# Patient Record
Sex: MALE | Race: ASIAN | NOT HISPANIC OR LATINO
[De-identification: names, ages, dates, MRNs, and addresses within clinical notes are randomized per-mention and may not be internally consistent; named-entity substitution may affect disease eponyms.]

---

## 2024-07-31 PROBLEM — Z00.00 ENCOUNTER FOR PREVENTIVE HEALTH EXAMINATION: Status: ACTIVE | Noted: 2024-07-31

## 2024-09-10 ENCOUNTER — APPOINTMENT (OUTPATIENT)
Age: 49
End: 2024-09-10

## 2025-03-17 ENCOUNTER — INPATIENT (INPATIENT)
Facility: HOSPITAL | Age: 50
LOS: 3 days | Discharge: HOME | End: 2025-03-21
Attending: INTERNAL MEDICINE | Admitting: INTERNAL MEDICINE
Payer: COMMERCIAL

## 2025-03-17 VITALS
OXYGEN SATURATION: 96 % | TEMPERATURE: 98 F | SYSTOLIC BLOOD PRESSURE: 150 MMHG | WEIGHT: 160.06 LBS | HEART RATE: 79 BPM | DIASTOLIC BLOOD PRESSURE: 81 MMHG | RESPIRATION RATE: 19 BRPM | HEIGHT: 61 IN

## 2025-03-17 DIAGNOSIS — R79.89 OTHER SPECIFIED ABNORMAL FINDINGS OF BLOOD CHEMISTRY: ICD-10-CM

## 2025-03-17 DIAGNOSIS — E78.5 HYPERLIPIDEMIA, UNSPECIFIED: ICD-10-CM

## 2025-03-17 LAB
ALBUMIN SERPL ELPH-MCNC: 3.6 G/DL — SIGNIFICANT CHANGE UP (ref 3.3–5)
ALP SERPL-CCNC: 98 U/L — SIGNIFICANT CHANGE UP (ref 40–120)
ALT FLD-CCNC: 264 U/L — HIGH (ref 12–78)
ANION GAP SERPL CALC-SCNC: 3 MMOL/L — LOW (ref 5–17)
APPEARANCE UR: CLEAR — SIGNIFICANT CHANGE UP
AST SERPL-CCNC: 999 U/L — HIGH (ref 15–37)
BACTERIA # UR AUTO: NEGATIVE /HPF — SIGNIFICANT CHANGE UP
BASOPHILS # BLD AUTO: 0.02 K/UL — SIGNIFICANT CHANGE UP (ref 0–0.2)
BASOPHILS NFR BLD AUTO: 0.2 % — SIGNIFICANT CHANGE UP (ref 0–2)
BILIRUB SERPL-MCNC: 1.1 MG/DL — SIGNIFICANT CHANGE UP (ref 0.2–1.2)
BILIRUB UR-MCNC: NEGATIVE — SIGNIFICANT CHANGE UP
BUN SERPL-MCNC: 15 MG/DL — SIGNIFICANT CHANGE UP (ref 7–23)
CALCIUM SERPL-MCNC: 9 MG/DL — SIGNIFICANT CHANGE UP (ref 8.5–10.1)
CK SERPL-CCNC: SIGNIFICANT CHANGE UP U/L (ref 26–308)
CO2 SERPL-SCNC: 29 MMOL/L — SIGNIFICANT CHANGE UP (ref 22–31)
COLOR SPEC: YELLOW — SIGNIFICANT CHANGE UP
CREAT SERPL-MCNC: 0.95 MG/DL — SIGNIFICANT CHANGE UP (ref 0.5–1.3)
DIFF PNL FLD: ABNORMAL
EGFR: 97 ML/MIN/1.73M2 — SIGNIFICANT CHANGE UP
EGFR: 97 ML/MIN/1.73M2 — SIGNIFICANT CHANGE UP
EOSINOPHIL # BLD AUTO: 0.04 K/UL — SIGNIFICANT CHANGE UP (ref 0–0.5)
EOSINOPHIL NFR BLD AUTO: 0.5 % — SIGNIFICANT CHANGE UP (ref 0–6)
EPI CELLS # UR: PRESENT
FLUAV AG NPH QL: SIGNIFICANT CHANGE UP
FLUBV AG NPH QL: SIGNIFICANT CHANGE UP
GLUCOSE SERPL-MCNC: 114 MG/DL — HIGH (ref 70–99)
GLUCOSE UR QL: NEGATIVE MG/DL — SIGNIFICANT CHANGE UP
HCT VFR BLD CALC: 39.7 % — SIGNIFICANT CHANGE UP (ref 39–50)
HGB BLD-MCNC: 13.3 G/DL — SIGNIFICANT CHANGE UP (ref 13–17)
IMM GRANULOCYTES NFR BLD AUTO: 0.2 % — SIGNIFICANT CHANGE UP (ref 0–0.9)
KETONES UR-MCNC: NEGATIVE MG/DL — SIGNIFICANT CHANGE UP
LACTATE SERPL-SCNC: 1.3 MMOL/L — SIGNIFICANT CHANGE UP (ref 0.7–2)
LEUKOCYTE ESTERASE UR-ACNC: NEGATIVE — SIGNIFICANT CHANGE UP
LYMPHOCYTES # BLD AUTO: 3.63 K/UL — HIGH (ref 1–3.3)
LYMPHOCYTES # BLD AUTO: 43.8 % — SIGNIFICANT CHANGE UP (ref 13–44)
MCHC RBC-ENTMCNC: 29.3 PG — SIGNIFICANT CHANGE UP (ref 27–34)
MCHC RBC-ENTMCNC: 33.5 G/DL — SIGNIFICANT CHANGE UP (ref 32–36)
MCV RBC AUTO: 87.4 FL — SIGNIFICANT CHANGE UP (ref 80–100)
MONOCYTES # BLD AUTO: 0.5 K/UL — SIGNIFICANT CHANGE UP (ref 0–0.9)
NEUTROPHILS # BLD AUTO: 4.08 K/UL — SIGNIFICANT CHANGE UP (ref 1.8–7.4)
NEUTROPHILS NFR BLD AUTO: 49.3 % — SIGNIFICANT CHANGE UP (ref 43–77)
NITRITE UR-MCNC: NEGATIVE — SIGNIFICANT CHANGE UP
NRBC BLD AUTO-RTO: 0 /100 WBCS — SIGNIFICANT CHANGE UP (ref 0–0)
PH UR: 6 — SIGNIFICANT CHANGE UP (ref 5–8)
PLATELET # BLD AUTO: 264 K/UL — SIGNIFICANT CHANGE UP (ref 150–400)
POTASSIUM SERPL-MCNC: 4.7 MMOL/L — SIGNIFICANT CHANGE UP (ref 3.5–5.3)
POTASSIUM SERPL-SCNC: 4.7 MMOL/L — SIGNIFICANT CHANGE UP (ref 3.5–5.3)
PROT SERPL-MCNC: 7.5 GM/DL — SIGNIFICANT CHANGE UP (ref 6–8.3)
PROT UR-MCNC: 30 MG/DL
RBC # BLD: 4.54 M/UL — SIGNIFICANT CHANGE UP (ref 4.2–5.8)
RBC # FLD: 13.5 % — SIGNIFICANT CHANGE UP (ref 10.3–14.5)
RBC CASTS # UR COMP ASSIST: SIGNIFICANT CHANGE UP /HPF (ref 0–4)
RSV RNA NPH QL NAA+NON-PROBE: SIGNIFICANT CHANGE UP
SARS-COV-2 RNA SPEC QL NAA+PROBE: SIGNIFICANT CHANGE UP
SODIUM SERPL-SCNC: 138 MMOL/L — SIGNIFICANT CHANGE UP (ref 135–145)
UROBILINOGEN FLD QL: 0.2 MG/DL — SIGNIFICANT CHANGE UP (ref 0.2–1)
WBC # BLD: 8.29 K/UL — SIGNIFICANT CHANGE UP (ref 3.8–10.5)
WBC UR QL: SIGNIFICANT CHANGE UP /HPF (ref 0–5)

## 2025-03-17 PROCEDURE — 99222 1ST HOSP IP/OBS MODERATE 55: CPT

## 2025-03-17 PROCEDURE — 76705 ECHO EXAM OF ABDOMEN: CPT | Mod: 26

## 2025-03-17 PROCEDURE — 99285 EMERGENCY DEPT VISIT HI MDM: CPT

## 2025-03-17 PROCEDURE — 74177 CT ABD & PELVIS W/CONTRAST: CPT | Mod: 26

## 2025-03-17 RX ORDER — ACETAMINOPHEN 500 MG/5ML
975 LIQUID (ML) ORAL ONCE
Refills: 0 | Status: COMPLETED | OUTPATIENT
Start: 2025-03-17 | End: 2025-03-17

## 2025-03-17 RX ORDER — SODIUM CHLORIDE 9 G/1000ML
1000 INJECTION, SOLUTION INTRAVENOUS
Refills: 0 | Status: DISCONTINUED | OUTPATIENT
Start: 2025-03-17 | End: 2025-03-19

## 2025-03-17 RX ORDER — LIDOCAINE HYDROCHLORIDE 20 MG/ML
1 JELLY TOPICAL ONCE
Refills: 0 | Status: COMPLETED | OUTPATIENT
Start: 2025-03-17 | End: 2025-03-17

## 2025-03-17 RX ORDER — IBUPROFEN 200 MG
600 TABLET ORAL ONCE
Refills: 0 | Status: COMPLETED | OUTPATIENT
Start: 2025-03-17 | End: 2025-03-17

## 2025-03-17 RX ORDER — MELATONIN 5 MG
3 TABLET ORAL AT BEDTIME
Refills: 0 | Status: DISCONTINUED | OUTPATIENT
Start: 2025-03-17 | End: 2025-03-21

## 2025-03-17 RX ORDER — INFLUENZA A VIRUS A/IDAHO/07/2018 (H1N1) ANTIGEN (MDCK CELL DERIVED, PROPIOLACTONE INACTIVATED, INFLUENZA A VIRUS A/INDIANA/08/2018 (H3N2) ANTIGEN (MDCK CELL DERIVED, PROPIOLACTONE INACTIVATED), INFLUENZA B VIRUS B/SINGAPORE/INFTT-16-0610/2016 ANTIGEN (MDCK CELL DERIVED, PROPIOLACTONE INACTIVATED), INFLUENZA B VIRUS B/IOWA/06/2017 ANTIGEN (MDCK CELL DERIVED, PROPIOLACTONE INACTIVATED) 15; 15; 15; 15 UG/.5ML; UG/.5ML; UG/.5ML; UG/.5ML
0.5 INJECTION, SUSPENSION INTRAMUSCULAR ONCE
Refills: 0 | Status: DISCONTINUED | OUTPATIENT
Start: 2025-03-17 | End: 2025-03-21

## 2025-03-17 RX ADMIN — LIDOCAINE HYDROCHLORIDE 1 PATCH: 20 JELLY TOPICAL at 10:34

## 2025-03-17 RX ADMIN — Medication 600 MILLIGRAM(S): at 21:37

## 2025-03-17 RX ADMIN — LIDOCAINE HYDROCHLORIDE 1 PATCH: 20 JELLY TOPICAL at 23:37

## 2025-03-17 RX ADMIN — LIDOCAINE HYDROCHLORIDE 1 PATCH: 20 JELLY TOPICAL at 19:36

## 2025-03-17 RX ADMIN — Medication 1000 MILLILITER(S): at 13:53

## 2025-03-17 RX ADMIN — Medication 975 MILLIGRAM(S): at 10:33

## 2025-03-17 RX ADMIN — Medication 1000 MILLILITER(S): at 12:42

## 2025-03-17 RX ADMIN — Medication 2000 MILLILITER(S): at 10:37

## 2025-03-17 RX ADMIN — Medication 2000 MILLILITER(S): at 13:31

## 2025-03-17 RX ADMIN — Medication 975 MILLIGRAM(S): at 13:30

## 2025-03-17 RX ADMIN — Medication 600 MILLIGRAM(S): at 22:39

## 2025-03-17 RX ADMIN — SODIUM CHLORIDE 150 MILLILITER(S): 9 INJECTION, SOLUTION INTRAVENOUS at 16:58

## 2025-03-17 NOTE — H&P ADULT - ASSESSMENT
49 years old male with h/o HLD present to ED with complain of low back pain and tea colored urine for 1 day. No recently viral symptoms. Patient was off work for 7 and 1/2 month. He just returned to work 3 days ago. He works as  and his work involved a lot of strenuous physical activity.   Hemodynamically stable, afebrile, sat well at RA. No leukocytosis, plt 264, Cr 0.95, AST/999/264, [place] [address]. CT abd/pelvis with no acute pathology

## 2025-03-17 NOTE — ED ADULT NURSE NOTE - OBJECTIVE STATEMENT
Patient alert and verbally responsive, came in due to  left flank pain and swelling start yesterday morning, Went to urgent care and had blood test done and was told  CK, AST and ALT were high, advised to go to ED. No PMH.

## 2025-03-17 NOTE — ED ADULT NURSE NOTE - NSFALLUNIVINTERV_ED_ALL_ED
Bed/Stretcher in lowest position, wheels locked, appropriate side rails in place/Call bell, personal items and telephone in reach/Instruct patient to call for assistance before getting out of bed/chair/stretcher/Non-slip footwear applied when patient is off stretcher/Amorita to call system/Physically safe environment - no spills, clutter or unnecessary equipment/Purposeful proactive rounding/Room/bathroom lighting operational, light cord in reach

## 2025-03-17 NOTE — H&P ADULT - PROBLEM SELECTOR PLAN 1
lower back pain  CT abd/pelvis  ( I personally review) with no acute pathology  [place] [address]  IV fluid LR 150ml/hr   Trend CK  closely monitor renal function  hold statin  Check RVP

## 2025-03-17 NOTE — H&P ADULT - HISTORY OF PRESENT ILLNESS
49 years old male with h/o HLD present to ED with complain of low back pain and tea colored urine for 1 day. No recently viral symptoms. Patient was off work for 7 and 1/2 month. He just returned to work 3 days ago. He works as  and his work involved a lot of strenuous physical activity.   Hemodynamically stable, afebrile, sat well at RA. No leukocytosis, plt 264, Cr 0.95, AST/999/264, [place] [address]. CT abd/pelvis with no acute pathology

## 2025-03-17 NOTE — ED PROVIDER NOTE - ATTENDING APP SHARED VISIT CONTRIBUTION OF CARE
I independently evaluated the patient and I concur with MADYSON Cavanaugh's H&P as above. Elevated LFTs likely 2/2 rhabdomyolysis which is likely due to increased exertion as pt states hes trying to lose weight and has to go to PT due to recent left shoulder injury vs statin usage. Given severity of CK elevation, admission indicated.

## 2025-03-17 NOTE — H&P ADULT - NSHPPHYSICALEXAM_GEN_ALL_CORE
CONSTITUTIONAL: alert and cooperative, no acute distress.  EYES: PERRL,  no scleral icterus  ENT: Mucosa moist, tongue normal  NECK: Neck supple, trachea midline, non-tender  CARDIAC: Normal S1 and S2. Regular rate and rhythms. No Pedal edema  LUNGS: Equal air entry both lungs. No rales, rhonchi, wheezing. Normal respiratory effort.   ABDOMEN: Soft, nondistended, nontender. No guarding or rebound tenderness. No hepatomegaly or splenomegaly. Bowel sound normal.   MUSCULOSKELETAL: Normocephalic, atraumatic. Slight lower back muscle tenderness+.  NEUROLOGICAL: No gross motor or sensory deficits  SKIN: no lesions or eruptions. Normal turgor  PSYCHIATRIC: A&O x 3, appropriate mood and affect

## 2025-03-17 NOTE — ED PROVIDER NOTE - PHYSICAL EXAMINATION
PHYSICAL EXAM:    GENERAL: Alert, appears stated age, well appearing, non-toxic  SKIN: Warm, and dry.   HEAD: NC, AT  EYE: Normal lids/conjunctiva, PERRL, EOMI  ENT: Normal hearing, patent oropharynx   NECK: +supple. No meningismus, or JVD  Pulm: Bilateral BS, normal resp effort, no wheezes, stridor, or retractions  CV: RRR, no M/R/G, 2+and = radial pulses  Abd: soft, non-tender, non-distended, no rebound/guarding. no CVA tenderness.   Mskel: no erythema, cyanosis, edema. no calf tenderness. +left paralumbar ttp. no spinal ttp.   Neuro: AAOx3, moving extremities

## 2025-03-17 NOTE — PHARMACOTHERAPY INTERVENTION NOTE - COMMENTS
Recommended to discontinue sodium chloride 0.9% maintenance fluids as patient also has lactated ringer's ordered.

## 2025-03-17 NOTE — ED ADULT TRIAGE NOTE - CHIEF COMPLAINT QUOTE
Came in for left flank pain and swelling start yesterday morning, Went to urgent care and had blood test done. Was called last night by urgent care that CK, AST and ALT were high, advised to go to ED. No PMH.

## 2025-03-17 NOTE — ED PROVIDER NOTE - CLINICAL SUMMARY MEDICAL DECISION MAKING FREE TEXT BOX
MADYSON Cavanaugh:   49-year-old male with PMH HLD on atorvastatin presents with L flank pain ?swelling, hematuria x yesterday-- significantly improved, had labs done @ [facility] yesterday and today sent in by urgent care for elevated CK level 35,000, elevated AST/ALT 500s.  no cp, sob, abd pain, fever, sweats, chills, n/v/d, dysuria, trauma.  exam as above  ddx includes rhabdomyolysis, kidney stone, uti, pyelo, gallbladder pathology  Ct neg for acute path  labs remarkable for ck 44K, ast 999/264-- tbili 1.1  pending RUQ sono.  discussed with Dr. Alcocer, accepts admission to his service

## 2025-03-18 LAB
ALBUMIN SERPL ELPH-MCNC: 3.2 G/DL — LOW (ref 3.3–5)
ALP SERPL-CCNC: 80 U/L — SIGNIFICANT CHANGE UP (ref 40–120)
ALT FLD-CCNC: 228 U/L — HIGH (ref 12–78)
ANION GAP SERPL CALC-SCNC: 3 MMOL/L — LOW (ref 5–17)
AST SERPL-CCNC: 646 U/L — HIGH (ref 15–37)
BILIRUB SERPL-MCNC: 0.7 MG/DL — SIGNIFICANT CHANGE UP (ref 0.2–1.2)
BUN SERPL-MCNC: 12 MG/DL — SIGNIFICANT CHANGE UP (ref 7–23)
CALCIUM SERPL-MCNC: 8.7 MG/DL — SIGNIFICANT CHANGE UP (ref 8.5–10.1)
CHLORIDE SERPL-SCNC: 107 MMOL/L — SIGNIFICANT CHANGE UP (ref 96–108)
CK SERPL-CCNC: SIGNIFICANT CHANGE UP U/L (ref 26–308)
CO2 SERPL-SCNC: 29 MMOL/L — SIGNIFICANT CHANGE UP (ref 22–31)
CREAT SERPL-MCNC: 0.85 MG/DL — SIGNIFICANT CHANGE UP (ref 0.5–1.3)
EGFR: 105 ML/MIN/1.73M2 — SIGNIFICANT CHANGE UP
EGFR: 105 ML/MIN/1.73M2 — SIGNIFICANT CHANGE UP
GLUCOSE SERPL-MCNC: 115 MG/DL — HIGH (ref 70–99)
HCT VFR BLD CALC: 36.5 % — LOW (ref 39–50)
HGB BLD-MCNC: 11.8 G/DL — LOW (ref 13–17)
MAGNESIUM SERPL-MCNC: 2.1 MG/DL — SIGNIFICANT CHANGE UP (ref 1.6–2.6)
MCHC RBC-ENTMCNC: 28.5 PG — SIGNIFICANT CHANGE UP (ref 27–34)
MCV RBC AUTO: 88.2 FL — SIGNIFICANT CHANGE UP (ref 80–100)
NRBC BLD AUTO-RTO: 0 /100 WBCS — SIGNIFICANT CHANGE UP (ref 0–0)
PHOSPHATE SERPL-MCNC: 3.3 MG/DL — SIGNIFICANT CHANGE UP (ref 2.5–4.5)
PLATELET # BLD AUTO: 237 K/UL — SIGNIFICANT CHANGE UP (ref 150–400)
POTASSIUM SERPL-MCNC: 4.3 MMOL/L — SIGNIFICANT CHANGE UP (ref 3.5–5.3)
POTASSIUM SERPL-SCNC: 4.3 MMOL/L — SIGNIFICANT CHANGE UP (ref 3.5–5.3)
RBC # BLD: 4.14 M/UL — LOW (ref 4.2–5.8)
RBC # FLD: 13.4 % — SIGNIFICANT CHANGE UP (ref 10.3–14.5)
SODIUM SERPL-SCNC: 139 MMOL/L — SIGNIFICANT CHANGE UP (ref 135–145)
WBC # BLD: 7.72 K/UL — SIGNIFICANT CHANGE UP (ref 3.8–10.5)
WBC # FLD AUTO: 7.72 K/UL — SIGNIFICANT CHANGE UP (ref 3.8–10.5)

## 2025-03-18 PROCEDURE — 99232 SBSQ HOSP IP/OBS MODERATE 35: CPT

## 2025-03-18 RX ORDER — OXYCODONE HYDROCHLORIDE 30 MG/1
5 TABLET ORAL ONCE
Refills: 0 | Status: DISCONTINUED | OUTPATIENT
Start: 2025-03-18 | End: 2025-03-18

## 2025-03-18 RX ORDER — LIDOCAINE HYDROCHLORIDE 20 MG/ML
1 JELLY TOPICAL EVERY 24 HOURS
Refills: 0 | Status: DISCONTINUED | OUTPATIENT
Start: 2025-03-18 | End: 2025-03-21

## 2025-03-18 RX ORDER — KETOROLAC TROMETHAMINE 30 MG/ML
15 INJECTION, SOLUTION INTRAMUSCULAR; INTRAVENOUS ONCE
Refills: 0 | Status: DISCONTINUED | OUTPATIENT
Start: 2025-03-18 | End: 2025-03-18

## 2025-03-18 RX ADMIN — KETOROLAC TROMETHAMINE 15 MILLIGRAM(S): 30 INJECTION, SOLUTION INTRAMUSCULAR; INTRAVENOUS at 02:04

## 2025-03-18 RX ADMIN — KETOROLAC TROMETHAMINE 15 MILLIGRAM(S): 30 INJECTION, SOLUTION INTRAMUSCULAR; INTRAVENOUS at 03:45

## 2025-03-18 RX ADMIN — OXYCODONE HYDROCHLORIDE 5 MILLIGRAM(S): 30 TABLET ORAL at 23:58

## 2025-03-18 RX ADMIN — LIDOCAINE HYDROCHLORIDE 1 PATCH: 20 JELLY TOPICAL at 23:58

## 2025-03-18 RX ADMIN — SODIUM CHLORIDE 150 MILLILITER(S): 9 INJECTION, SOLUTION INTRAVENOUS at 13:58

## 2025-03-18 RX ADMIN — SODIUM CHLORIDE 150 MILLILITER(S): 9 INJECTION, SOLUTION INTRAVENOUS at 06:20

## 2025-03-19 LAB
ALBUMIN SERPL ELPH-MCNC: 3.5 G/DL — SIGNIFICANT CHANGE UP (ref 3.3–5)
ALP SERPL-CCNC: 83 U/L — SIGNIFICANT CHANGE UP (ref 40–120)
ALT FLD-CCNC: 235 U/L — HIGH (ref 12–78)
AST SERPL-CCNC: 482 U/L — HIGH (ref 15–37)
BILIRUB SERPL-MCNC: 0.7 MG/DL — SIGNIFICANT CHANGE UP (ref 0.2–1.2)
BUN SERPL-MCNC: 13 MG/DL — SIGNIFICANT CHANGE UP (ref 7–23)
CALCIUM SERPL-MCNC: 9.4 MG/DL — SIGNIFICANT CHANGE UP (ref 8.5–10.1)
CHLORIDE SERPL-SCNC: 103 MMOL/L — SIGNIFICANT CHANGE UP (ref 96–108)
CK SERPL-CCNC: SIGNIFICANT CHANGE UP U/L (ref 26–308)
CO2 SERPL-SCNC: 30 MMOL/L — SIGNIFICANT CHANGE UP (ref 22–31)
CREAT SERPL-MCNC: 0.92 MG/DL — SIGNIFICANT CHANGE UP (ref 0.5–1.3)
EGFR: 101 ML/MIN/1.73M2 — SIGNIFICANT CHANGE UP
EGFR: 101 ML/MIN/1.73M2 — SIGNIFICANT CHANGE UP
GLUCOSE SERPL-MCNC: 115 MG/DL — HIGH (ref 70–99)
HCT VFR BLD CALC: 39.3 % — SIGNIFICANT CHANGE UP (ref 39–50)
HGB BLD-MCNC: 12.8 G/DL — LOW (ref 13–17)
MCHC RBC-ENTMCNC: 28.6 PG — SIGNIFICANT CHANGE UP (ref 27–34)
MCHC RBC-ENTMCNC: 32.6 G/DL — SIGNIFICANT CHANGE UP (ref 32–36)
MCV RBC AUTO: 87.9 FL — SIGNIFICANT CHANGE UP (ref 80–100)
NRBC BLD AUTO-RTO: 0 /100 WBCS — SIGNIFICANT CHANGE UP (ref 0–0)
PLATELET # BLD AUTO: 276 K/UL — SIGNIFICANT CHANGE UP (ref 150–400)
POTASSIUM SERPL-MCNC: 3.9 MMOL/L — SIGNIFICANT CHANGE UP (ref 3.5–5.3)
POTASSIUM SERPL-SCNC: 3.9 MMOL/L — SIGNIFICANT CHANGE UP (ref 3.5–5.3)
PROT SERPL-MCNC: 7.3 GM/DL — SIGNIFICANT CHANGE UP (ref 6–8.3)
RBC # BLD: 4.47 M/UL — SIGNIFICANT CHANGE UP (ref 4.2–5.8)
RBC # FLD: 13.3 % — SIGNIFICANT CHANGE UP (ref 10.3–14.5)
WBC # BLD: 9.56 K/UL — SIGNIFICANT CHANGE UP (ref 3.8–10.5)
WBC # FLD AUTO: 9.56 K/UL — SIGNIFICANT CHANGE UP (ref 3.8–10.5)

## 2025-03-19 PROCEDURE — 99232 SBSQ HOSP IP/OBS MODERATE 35: CPT

## 2025-03-19 RX ORDER — OXYCODONE HYDROCHLORIDE 30 MG/1
5 TABLET ORAL ONCE
Refills: 0 | Status: DISCONTINUED | OUTPATIENT
Start: 2025-03-19 | End: 2025-03-19

## 2025-03-19 RX ORDER — SODIUM CHLORIDE 9 G/1000ML
1000 INJECTION, SOLUTION INTRAVENOUS
Refills: 0 | Status: DISCONTINUED | OUTPATIENT
Start: 2025-03-19 | End: 2025-03-21

## 2025-03-19 RX ADMIN — OXYCODONE HYDROCHLORIDE 5 MILLIGRAM(S): 30 TABLET ORAL at 00:28

## 2025-03-19 RX ADMIN — SODIUM CHLORIDE 150 MILLILITER(S): 9 INJECTION, SOLUTION INTRAVENOUS at 10:29

## 2025-03-19 RX ADMIN — Medication 20 MILLIGRAM(S): at 21:45

## 2025-03-19 RX ADMIN — SODIUM CHLORIDE 175 MILLILITER(S): 9 INJECTION, SOLUTION INTRAVENOUS at 17:57

## 2025-03-19 RX ADMIN — Medication 3 MILLIGRAM(S): at 02:05

## 2025-03-19 RX ADMIN — LIDOCAINE HYDROCHLORIDE 1 PATCH: 20 JELLY TOPICAL at 11:11

## 2025-03-19 RX ADMIN — OXYCODONE HYDROCHLORIDE 5 MILLIGRAM(S): 30 TABLET ORAL at 06:57

## 2025-03-19 RX ADMIN — LIDOCAINE HYDROCHLORIDE 1 PATCH: 20 JELLY TOPICAL at 07:11

## 2025-03-19 RX ADMIN — OXYCODONE HYDROCHLORIDE 5 MILLIGRAM(S): 30 TABLET ORAL at 07:27

## 2025-03-20 LAB
ALBUMIN SERPL ELPH-MCNC: 3.4 G/DL — SIGNIFICANT CHANGE UP (ref 3.3–5)
ALP SERPL-CCNC: 82 U/L — SIGNIFICANT CHANGE UP (ref 40–120)
ANION GAP SERPL CALC-SCNC: 5 MMOL/L — SIGNIFICANT CHANGE UP (ref 5–17)
BILIRUB SERPL-MCNC: 0.7 MG/DL — SIGNIFICANT CHANGE UP (ref 0.2–1.2)
BUN SERPL-MCNC: 12 MG/DL — SIGNIFICANT CHANGE UP (ref 7–23)
CALCIUM SERPL-MCNC: 9.2 MG/DL — SIGNIFICANT CHANGE UP (ref 8.5–10.1)
CHLORIDE SERPL-SCNC: 104 MMOL/L — SIGNIFICANT CHANGE UP (ref 96–108)
CK SERPL-CCNC: 6222 U/L — HIGH (ref 26–308)
CO2 SERPL-SCNC: 29 MMOL/L — SIGNIFICANT CHANGE UP (ref 22–31)
CREAT SERPL-MCNC: 0.89 MG/DL — SIGNIFICANT CHANGE UP (ref 0.5–1.3)
EGFR: 104 ML/MIN/1.73M2 — SIGNIFICANT CHANGE UP
EGFR: 104 ML/MIN/1.73M2 — SIGNIFICANT CHANGE UP
GLUCOSE SERPL-MCNC: 111 MG/DL — HIGH (ref 70–99)
POTASSIUM SERPL-MCNC: 3.8 MMOL/L — SIGNIFICANT CHANGE UP (ref 3.5–5.3)
POTASSIUM SERPL-SCNC: 3.8 MMOL/L — SIGNIFICANT CHANGE UP (ref 3.5–5.3)
PROT SERPL-MCNC: 6.7 GM/DL — SIGNIFICANT CHANGE UP (ref 6–8.3)
SODIUM SERPL-SCNC: 138 MMOL/L — SIGNIFICANT CHANGE UP (ref 135–145)

## 2025-03-20 PROCEDURE — 99232 SBSQ HOSP IP/OBS MODERATE 35: CPT

## 2025-03-20 RX ORDER — SODIUM CHLORIDE 0.65 %
1 AEROSOL, SPRAY (ML) NASAL
Refills: 0 | Status: DISCONTINUED | OUTPATIENT
Start: 2025-03-20 | End: 2025-03-21

## 2025-03-20 RX ORDER — POLYETHYLENE GLYCOL 3350 17 G/17G
17 POWDER, FOR SOLUTION ORAL DAILY
Refills: 0 | Status: DISCONTINUED | OUTPATIENT
Start: 2025-03-20 | End: 2025-03-21

## 2025-03-20 RX ADMIN — LIDOCAINE HYDROCHLORIDE 1 PATCH: 20 JELLY TOPICAL at 21:47

## 2025-03-20 RX ADMIN — Medication 3 MILLIGRAM(S): at 00:06

## 2025-03-20 NOTE — PROGRESS NOTE ADULT - ASSESSMENT
49 years old male with h/o HLD present to ED with complain of low back pain and tea colored urine for 1 day. No recently viral symptoms. Patient was off work for 7 and 1/2 month. He just returned to work 3 days ago. He works as  and his work involved a lot of strenuous physical activity.   Hemodynamically stable, afebrile, sat well at RA. No leukocytosis, plt 264, Cr 0.95, AST/999/264, [place] [address]. CT abd/pelvis with no acute pathology       Problem/Plan - 1:  ·  Problem: Rhabdomyolysis.   ·  Plan: lower back pain  CT abd/pelvis with no acute [place] [address] - > improved to ~ 30k.  Continue IVF IV fluid LR 150ml/hr   Trend CK  closely monitor renal function  hold statin     Problem/Plan - 2:  ·  Problem: Elevated LFTs / Hepatic Steatosis.   ·  Plan: AST/999/264  Due to rhabdomyolysis  Monitor CK and LFT  Trending down.   RUQ ultrasound with hepatic steatosis.  Pt reports distant h/o EtOH abuse.  Counselled on outpatient f/u with PMD, GI.  He acknowledged understanding. .     Problem/Plan - 3:  ·  Problem: Hyperlipidemia, unspecified.   ·  Plan: hold of statin for now given elevated LFT.    # DVT Prophylaxis - OOB   
49 years old male with h/o HLD present to ED with complain of low back pain and tea colored urine for 1 day. No recently viral symptoms. Patient was off work for 7 and 1/2 month. He just returned to work 3 days ago. He works as  and his work involved a lot of strenuous physical activity.   Hemodynamically stable, afebrile, sat well at RA. No leukocytosis, plt 264, Cr 0.95, AST/999/264, [place] [address]. CT abd/pelvis with no acute pathology       Problem/Plan - 1:  ·  Problem: Rhabdomyolysis.   ·  Plan: lower back pain  CT abd/pelvis with no acute [place]  [place] [address] - >30k_>17k increase IVF IV fluid LR 175ml/hr   Trend CK  closely monitor renal function  hold statin     Problem/Plan - 2:  ·  Problem: ransaminitis   ·  Plan: AST/999/264  Due to rhabdomyolysis  Monitor CK and LFT  Trending down.   RUQ ultrasound with hepatic steatosis.  Pt reports distant h/o EtOH abuse.  Counselled on outpatient f/u with PMD, GI.  He acknowledged understanding. .     Problem/Plan - 3:  ·  Problem: Hyperlipidemia, unspecified.   ·  Plan: hold of statin for now given elevated LFT.    # DVT Prophylaxis - OOB   
49 years old male with h/o HLD present to ED with complain of low back pain and tea colored urine for 1 day. No recently viral symptoms. Patient was off work for 7 and 1/2 month. He just returned to work 3 days ago. He works as  and his work involved a lot of strenuous physical activity.   Hemodynamically stable, afebrile, sat well at RA. No leukocytosis, plt 264, Cr 0.95, AST/999/264, [place] [address]. CT abd/pelvis with no acute pathology       Problem/Plan - 1:  ·  Problem: Rhabdomyolysis.   ·  Plan: lower back pain  CT abd/pelvis with no acute [place]  [place] [address] - >30k_>17k_>6k increase IVF IV fluid LR 175ml/hr   Trend CK  closely monitor renal function  hold statin     Problem/Plan - 2:  ·  Problem: ransaminitis   ·  Plan: AST/999/264  Due to rhabdomyolysis  Monitor CK and LFT  Trending down.   RUQ ultrasound with hepatic steatosis.  Pt reports distant h/o EtOH abuse.  Counselled on outpatient f/u with PMD, GI.  He acknowledged understanding. .     Problem/Plan - 3:  ·  Problem: Hyperlipidemia, unspecified.   ·  Plan: hold of statin for now given elevated LFT.    # DVT Prophylaxis - OOB

## 2025-03-20 NOTE — PROGRESS NOTE ADULT - SUBJECTIVE AND OBJECTIVE BOX
Patient: KIM HURTADO [id number] 49y Male                            Hospitalist Attending Note    Back pain improving.  Otherwise no myalgias.    Urine clear.  No complaints.     ____________________PHYSICAL EXAM:  GENERAL:  NAD Alert and Oriented x 3   HEENT: NCAT  CARDIOVASCULAR:  S1, S2  LUNGS: CTAB  ABDOMEN:  soft, (-) tenderness, (-) distension, (+) bowel sounds, (-) guarding, (-) rebound (-) rigidity  EXTREMITIES:  no cyanosis / clubbing / edema.   ____________________     VITALS:  Vital Signs Last 24 Hrs  T(C): 36.6 (18 Mar 2025 10:49), Max: 36.8 (18 Mar 2025 00:02)  T(F): 97.9 (18 Mar 2025 10:49), Max: 98.2 (18 Mar 2025 00:02)  HR: 86 (18 Mar 2025 10:49) (65 - 86)  BP: 147/69 (18 Mar 2025 10:49) (129/78 - 152/84)  BP(mean): --  RR: 18 (18 Mar 2025 10:49) (17 - 18)  SpO2: 98% (18 Mar 2025 10:49) (97% - 98%)    Parameters below as of 18 Mar 2025 10:49  Patient On (Oxygen Delivery Method): room air     Daily     Daily Weight in k.4 (17 Mar 2025 17:45)  CAPILLARY BLOOD GLUCOSE        I&O's Summary    17 Mar 2025 07:01  -  18 Mar 2025 07:00  --------------------------------------------------------  IN: 1800 mL / OUT: 0 mL / NET: 1800 mL        HISTORY:  PAST MEDICAL & SURGICAL HISTORY:  No pertinent past medical history      Allergies    No Known Allergies    Intolerances       LABS:                        11.8   7.72  )-----------( 237      ( 18 Mar 2025 05:48 )             36.5       Urinalysis with Rflx Culture (collected 17 Mar 2025 10:24)    03-    139  |  107  |  12  ----------------------------<  115[H]  4.3   |  29  |  0.85    Ca    8.7      18 Mar 2025 05:48  Phos  3.3       Mg     2.1         TPro  6.5  /  Alb  3.2[L]  /  TBili  0.7  /  DBili  x   /  AST  646[H]  /  ALT  228[H]  /  AlkPhos  80        LIVER FUNCTIONS - ( 18 Mar 2025 05:48 )  Alb: 3.2 g/dL / Pro: 6.5 gm/dL / ALK PHOS: 80 U/L / ALT: 228 U/L / AST: 646 U/L / GGT: x           Urinalysis Basic - ( 18 Mar 2025 05:48 )    Color: x / Appearance: x / SG: x / pH: x  Gluc: 115 mg/dL / Ketone: x  / Bili: x / Urobili: x   Blood: x / Protein: x / Nitrite: x   Leuk Esterase: x / RBC: x / WBC x   Sq Epi: x / Non Sq Epi: x / Bacteria: x          Urinalysis with Rflx Culture (collected 17 Mar 2025 10:24)          MEDICATIONS:  MEDICATIONS  (STANDING):  influenza   Vaccine 0.5 milliLiter(s) IntraMuscular once  lactated ringers. 1000 milliLiter(s) (150 mL/Hr) IV Continuous <Continuous>    MEDICATIONS  (PRN):  melatonin 3 milliGRAM(s) Oral at bedtime PRN Insomnia  
Patient is a 49y old  Male who presents with a chief complaint of rhabdomyolysis (18 Mar 2025 14:46)    INTERVAL HPI/OVERNIGHT EVENTS:    MEDICATIONS  (STANDING):  influenza   Vaccine 0.5 milliLiter(s) IntraMuscular once  lactated ringers. 1000 milliLiter(s) (175 mL/Hr) IV Continuous <Continuous>  lidocaine   4% Patch 1 Patch Transdermal every 24 hours    MEDICATIONS  (PRN):  famotidine    Tablet 20 milliGRAM(s) Oral once PRN dyspepsia  melatonin 3 milliGRAM(s) Oral at bedtime PRN Insomnia    Allergies    No Known Allergies    Intolerances      REVIEW OF SYSTEMS:  All other systems reviewed and are negative    Vital Signs Last 24 Hrs  T(C): 36.6 (19 Mar 2025 10:46), Max: 37.1 (19 Mar 2025 00:01)  T(F): 97.8 (19 Mar 2025 10:46), Max: 98.8 (19 Mar 2025 00:01)  HR: 88 (19 Mar 2025 10:46) (66 - 88)  BP: 144/84 (19 Mar 2025 10:46) (119/78 - 144/84)  BP(mean): --  RR: 18 (19 Mar 2025 10:46) (18 - 18)  SpO2: 98% (19 Mar 2025 10:46) (95% - 98%)    Parameters below as of 19 Mar 2025 10:46  Patient On (Oxygen Delivery Method): room air      Daily     Daily   I&O's Summary    18 Mar 2025 07:01  -  19 Mar 2025 07:00  --------------------------------------------------------  IN: 0 mL / OUT: 900 mL / NET: -900 mL      CAPILLARY BLOOD GLUCOSE        PHYSICAL EXAM:  GENERAL: NAD,    HEAD:  Atraumatic, Normocephalic  EYES: EOMI, PERRLA, conjunctiva and sclera clear  ENMT: No tonsillar erythema, exudates, or enlargement; Moist mucous membranes, Good dentition, No lesions  NECK: Supple, No JVD, Normal thyroid  NERVOUS SYSTEM:  Alert & Oriented X3, Good concentration; Motor Strength 5/5 B/L upper and lower extremities; DTRs 2+ intact and symmetric  CHEST/LUNG: Clear to percussion bilaterally; No rales, rhonchi, wheezing, or rubs  HEART: Regular rate and rhythm; No murmurs, rubs, or gallops  ABDOMEN: Soft, Nontender, Nondistended; Bowel sounds present  EXTREMITIES:  2+ Peripheral Pulses, No clubbing, cyanosis, or edema  LYMPH: No lymphadenopathy noted  SKIN: No rashes or lesions    Labs                          12.8   9.56  )-----------( 276      ( 19 Mar 2025 07:22 )             39.3     03-19    139  |  103  |  13  ----------------------------<  115[H]  3.9   |  30  |  0.92    Ca    9.4      19 Mar 2025 07:22  Phos  3.3     03-18  Mg     2.1     03-18    TPro  7.3  /  Alb  3.5  /  TBili  0.7  /  DBili  x   /  AST  482[H]  /  ALT  235[H]  /  AlkPhos  83  03-19          Urinalysis Basic - ( 19 Mar 2025 07:22 )    Color: x / Appearance: x / SG: x / pH: x  Gluc: 115 mg/dL / Ketone: x  / Bili: x / Urobili: x   Blood: x / Protein: x / Nitrite: x   Leuk Esterase: x / RBC: x / WBC x   Sq Epi: x / Non Sq Epi: x / Bacteria: x        Urinalysis with Rflx Culture (collected 17 Mar 2025 10:24)                DVT prophylaxis: > Lovenox 40mg SQ daily  > Heparin   > SCD's
Patient is a 49y old  Male who presents with a chief complaint of rhabdomyolysis (19 Mar 2025 12:27)    INTERVAL HPI/OVERNIGHT EVENTS: no events     MEDICATIONS  (STANDING):  influenza   Vaccine 0.5 milliLiter(s) IntraMuscular once  lactated ringers. 1000 milliLiter(s) (175 mL/Hr) IV Continuous <Continuous>  lidocaine   4% Patch 1 Patch Transdermal every 24 hours    MEDICATIONS  (PRN):  melatonin 3 milliGRAM(s) Oral at bedtime PRN Insomnia  polyethylene glycol 3350 17 Gram(s) Oral daily PRN Constipation    Allergies    No Known Allergies    Intolerances      REVIEW OF SYSTEMS:  All other systems reviewed and are negative    Vital Signs Last 24 Hrs  T(C): 36.8 (20 Mar 2025 10:47), Max: 36.8 (19 Mar 2025 23:46)  T(F): 98.2 (20 Mar 2025 10:47), Max: 98.2 (19 Mar 2025 23:46)  HR: 91 (20 Mar 2025 10:47) (71 - 91)  BP: 132/76 (20 Mar 2025 10:47) (126/74 - 153/82)  BP(mean): --  RR: 18 (20 Mar 2025 10:47) (18 - 18)  SpO2: 95% (20 Mar 2025 10:47) (95% - 98%)    Parameters below as of 20 Mar 2025 10:47  Patient On (Oxygen Delivery Method): room air      Daily     Daily   I&O's Summary    19 Mar 2025 07:01  -  20 Mar 2025 07:00  --------------------------------------------------------  IN: 0 mL / OUT: 1400 mL / NET: -1400 mL      CAPILLARY BLOOD GLUCOSE        PHYSICAL EXAM:  GENERAL: NAD,    HEAD:  Atraumatic, Normocephalic  EYES: EOMI, PERRLA, conjunctiva and sclera clear  ENMT: No tonsillar erythema, exudates, or enlargement; Moist mucous membranes, Good dentition, No lesions  NECK: Supple, No JVD, Normal thyroid  NERVOUS SYSTEM:  Alert & Oriented X3, Good concentration; Motor Strength 5/5 B/L upper and lower extremities; DTRs 2+ intact and symmetric  CHEST/LUNG: Clear to percussion bilaterally; No rales, rhonchi, wheezing, or rubs  HEART: Regular rate and rhythm; No murmurs, rubs, or gallops  ABDOMEN: Soft, Nontender, Nondistended; Bowel sounds present  EXTREMITIES:  2+ Peripheral Pulses, No clubbing, cyanosis, or edema  LYMPH: No lymphadenopathy noted  SKIN: No rashes or lesions    Labs                          12.8   9.56  )-----------( 276      ( 19 Mar 2025 07:22 )             39.3     03-20    138  |  104  |  12  ----------------------------<  111[H]  3.8   |  29  |  0.89    Ca    9.2      20 Mar 2025 08:37    TPro  6.7  /  Alb  3.4  /  TBili  0.7  /  DBili  x   /  AST  222[H]  /  ALT  185[H]  /  AlkPhos  82  03-20          Urinalysis Basic - ( 20 Mar 2025 08:37 )    Color: x / Appearance: x / SG: x / pH: x  Gluc: 111 mg/dL / Ketone: x  / Bili: x / Urobili: x   Blood: x / Protein: x / Nitrite: x   Leuk Esterase: x / RBC: x / WBC x   Sq Epi: x / Non Sq Epi: x / Bacteria: x                  DVT prophylaxis: > Lovenox 40mg SQ daily  > Heparin   > SCD's

## 2025-03-21 ENCOUNTER — TRANSCRIPTION ENCOUNTER (OUTPATIENT)
Age: 50
End: 2025-03-21

## 2025-03-21 VITALS
OXYGEN SATURATION: 94 % | DIASTOLIC BLOOD PRESSURE: 88 MMHG | SYSTOLIC BLOOD PRESSURE: 139 MMHG | HEART RATE: 99 BPM | TEMPERATURE: 98 F | RESPIRATION RATE: 18 BRPM

## 2025-03-21 LAB
ALBUMIN SERPL ELPH-MCNC: 3.5 G/DL — SIGNIFICANT CHANGE UP (ref 3.3–5)
ALP SERPL-CCNC: 89 U/L — SIGNIFICANT CHANGE UP (ref 40–120)
ALT FLD-CCNC: 161 U/L — HIGH (ref 12–78)
ANION GAP SERPL CALC-SCNC: 5 MMOL/L — SIGNIFICANT CHANGE UP (ref 5–17)
AST SERPL-CCNC: 120 U/L — HIGH (ref 15–37)
BILIRUB SERPL-MCNC: 0.5 MG/DL — SIGNIFICANT CHANGE UP (ref 0.2–1.2)
BUN SERPL-MCNC: 13 MG/DL — SIGNIFICANT CHANGE UP (ref 7–23)
CALCIUM SERPL-MCNC: 9.1 MG/DL — SIGNIFICANT CHANGE UP (ref 8.5–10.1)
CHLORIDE SERPL-SCNC: 105 MMOL/L — SIGNIFICANT CHANGE UP (ref 96–108)
CK SERPL-CCNC: 2696 U/L — HIGH (ref 26–308)
CO2 SERPL-SCNC: 28 MMOL/L — SIGNIFICANT CHANGE UP (ref 22–31)
EGFR: 102 ML/MIN/1.73M2 — SIGNIFICANT CHANGE UP
EGFR: 102 ML/MIN/1.73M2 — SIGNIFICANT CHANGE UP
GLUCOSE SERPL-MCNC: 100 MG/DL — HIGH (ref 70–99)
POTASSIUM SERPL-MCNC: 3.9 MMOL/L — SIGNIFICANT CHANGE UP (ref 3.5–5.3)
POTASSIUM SERPL-SCNC: 3.9 MMOL/L — SIGNIFICANT CHANGE UP (ref 3.5–5.3)
PROT SERPL-MCNC: 7.3 GM/DL — SIGNIFICANT CHANGE UP (ref 6–8.3)
SODIUM SERPL-SCNC: 138 MMOL/L — SIGNIFICANT CHANGE UP (ref 135–145)

## 2025-03-21 PROCEDURE — 99239 HOSP IP/OBS DSCHRG MGMT >30: CPT

## 2025-03-21 RX ORDER — ATORVASTATIN CALCIUM 80 MG/1
1 TABLET, FILM COATED ORAL
Refills: 0 | DISCHARGE

## 2025-03-21 RX ADMIN — LIDOCAINE HYDROCHLORIDE 1 PATCH: 20 JELLY TOPICAL at 08:31

## 2025-03-21 RX ADMIN — LIDOCAINE HYDROCHLORIDE 1 PATCH: 20 JELLY TOPICAL at 07:55

## 2025-03-21 RX ADMIN — SODIUM CHLORIDE 175 MILLILITER(S): 9 INJECTION, SOLUTION INTRAVENOUS at 08:16

## 2025-03-21 RX ADMIN — Medication 3 MILLIGRAM(S): at 00:26

## 2025-03-21 NOTE — DISCHARGE NOTE PROVIDER - NSDCCPCAREPLAN_GEN_ALL_CORE_FT
PRINCIPAL DISCHARGE DIAGNOSIS  Diagnosis: Rhabdomyolysis  Assessment and Plan of Treatment: Diagnoses:  Rhabdomyolysis: This is a condition where muscle tissue breaks down and releases its contents into the bloodstream. This is the likely cause of your back pain and dark-colored urine.  Transaminitis: This means you have elevated liver enzymes. This is likely related to the rhabdomyolysis, but also potentially related to past alcohol use.  Hyperlipidemia This means high cholesterol.  Activity:  Avoid strenuous physical activity. Do not return to your regular  work duties until cleared by your primary care physician (PCP). Your muscles need time to heal, and heavy lifting or strenuous activity could worsen your condition.  Rest when you feel tired.  Diet:  Hydration is crucial! Drink plenty of clear fluids, such as water, clear broth, or electrolyte solutions (like sports drinks, but in moderation due to sugar content).urine should be pale yellow or clear.  Avoid alcohol completely. Alcohol can further stress your liver.  Statin (for cholesterol): You should hold (temporarily stop) your statin medication for now because of your elevated liver enzymes. You will need to discuss restarting this medication with your PCP at your follow-up appointment.  Appointment: You must follow up with your PCP within the next week (ideally within 3-5 days). This is very important to:  Recheck your CK (creatine kinase) level to make sure it's going down. This shows your muscles are healing.  Recheck your liver function tests (LFTs) to make sure your liver enzymes are improving.  Discuss restarting your statin medication.  Discuss a safe plan for returning to work.        SECONDARY DISCHARGE DIAGNOSES  Diagnosis: Elevated LFTs  Assessment and Plan of Treatment: When to Seek Immediate Medical Attention (Go to the Emergency Room):  Decreased urine output or inability to urinate.  Darkening of your urine (becomes darker than it was today).  Increased muscle pain or weakness, especially if it's severe or spreading.  Swelling in your arms, legs, or face.  Nausea, vomiting, or severe abdominal pain.  Fever or chills.  Confusion, dizziness, or lightheadedness.  Yellowing of your skin or eyes (jaundice).

## 2025-03-21 NOTE — CHART NOTE - NSCHARTNOTEFT_GEN_A_CORE
To whom it may concern:     Aramis Bray DOB   71    has been under inpatient care at [facility] since   3/17/25   .  He   was discharged from the hospital 3/21/25. Patient is to see his primary care doctor next week for follow up, the primary doctor will provide further instructions regarding return to work.     If there are any questions please do not hesitate to call    Vinnie Kendall MD  [phone]

## 2025-03-21 NOTE — DISCHARGE NOTE NURSING/CASE MANAGEMENT/SOCIAL WORK - PATIENT PORTAL LINK FT
You can access the FollowMyHealth Patient Portal offered by [facility] by registering at the following website: [email or web address]. By joining [employer]’s FollowMyHealth portal, you will also be able to view your health information using other applications (apps) compatible with our system.

## 2025-03-21 NOTE — DISCHARGE NOTE PROVIDER - HOSPITAL COURSE
49 years old male with h/o HLD present to ED with complain of low back pain and tea colored urine for 1 day. No recently viral symptoms. Patient was off work for 7 and 1/2 month. He just returned to work 3 days ago. He works as  and his work involved a lot of strenuous physical activity.   Hemodynamically stable, afebrile, sat well at [facility]. No leukocytosis, plt 264, Cr 0.95, AST/999/264, [place] [address]. CT abd/pelvis with no acute pathology       Problem/Plan - 1:  ·  Problem: Rhabdomyolysis.   ·  Plan: lower back pain  CT abd/pelvis with [place] [address] - >30k_>17k_>6k->2600  stable for discharge to home  instructed to follow up with PCP next week for repeat CK and Liver function tests.      Problem/Plan - 2:  ·  Problem: Transaminitis   ·  Plan:    Trending down.   RUQ ultrasound with hepatic steatosis.  Pt reports distant h/o EtOH abuse.  Counselled on outpatient f/u with PMD      Problem/Plan - 3:  ·  Problem: Hyperlipidemia, unspecified.   ·  Plan: hold of statin for now given elevated LFT, resume after pcp visit.        pt seen and examined 45 min spent on dc planning       VITAL SIGNS:  Vital Signs Last 24 Hrs  T(C): 36.6 (21 Mar 2025 05:09), Max: 36.8 (20 Mar 2025 10:47)  T(F): 97.8 (21 Mar 2025 05:09), Max: 98.2 (20 Mar 2025 10:47)  HR: 72 (21 Mar 2025 05:09) (72 - 91)  BP: 134/78 (21 Mar 2025 05:09) (132/76 - 138/85)  BP(mean): --  RR: 18 (21 Mar 2025 05:09) (18 - 18)  SpO2: 95% (21 Mar 2025 05:09) (95% - 97%)    Parameters below as of 21 Mar 2025 05:09  Patient On (Oxygen Delivery Method): room air        PHYSICAL EXAM:  Constitutional: resting comfortably; NAD  HEENT: NC/AT, PERRL, EOMI, anicteric sclera, MMM  Neck: supple; no JVD or thyromegaly; no LAD  Respiratory: CTA B/L; no W/R/R, no retractions or increased work of breathing  Cardiac: +S1/S2; RRR; no M/R/G  Gastrointestinal: soft, NT/ND; no rebound or guarding; +BS  Extremities: WWP, no clubbing or cyanosis; brisk capillary refill; no peripheral edema  Musculoskeletal: NROM x4; no joint swelling, tenderness or erythema  Vascular: 2+ radial, DP/PT pulses B/L  Dermatologic: skin warm, dry and intact; no rashes, wounds, or scars  Neurologic: AAOx3, CN II-XII intact, no focal deficits, strength 5/5 and equal in all extremities, sensation intact  Psychiatric: calm, cooperative, behaviors are appropriate, denies SI/HI    Lab test review, Radiology Review, Vitals review, Consultant review and discussion, Physical examination, IDR, Assessment and plan; Plan discussion with patient and family

## 2025-03-21 NOTE — DISCHARGE NOTE NURSING/CASE MANAGEMENT/SOCIAL WORK - NSDCPEFALRISK_GEN_ALL_CORE
For information on Fall & Injury Prevention, visit: [email or web address] OR  [email or web address] OR  https://www.cdc.gov/steadi/patient.html

## 2025-03-21 NOTE — DISCHARGE NOTE NURSING/CASE MANAGEMENT/SOCIAL WORK - FINANCIAL ASSISTANCE
[facility] provides services at a reduced cost to those who are determined to be eligible through [facility]’s financial assistance program. Information regarding [facility]’s financial assistance program can be found by going to [email or web address] or by calling [phone].
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
## 2025-03-28 DIAGNOSIS — M54.50 LOW BACK PAIN, UNSPECIFIED: ICD-10-CM

## 2025-03-28 DIAGNOSIS — M62.82 RHABDOMYOLYSIS: ICD-10-CM

## 2025-03-28 DIAGNOSIS — R74.01 ELEVATION OF LEVELS OF LIVER TRANSAMINASE LEVELS: ICD-10-CM

## 2025-03-28 DIAGNOSIS — E78.5 HYPERLIPIDEMIA, UNSPECIFIED: ICD-10-CM

## 2025-03-28 DIAGNOSIS — R94.5 ABNORMAL RESULTS OF LIVER FUNCTION STUDIES: ICD-10-CM

## 2025-03-28 DIAGNOSIS — Z79.02 LONG TERM (CURRENT) USE OF ANTITHROMBOTICS/ANTIPLATELETS: ICD-10-CM
